# Patient Record
Sex: FEMALE | Race: WHITE | NOT HISPANIC OR LATINO | Employment: FULL TIME | URBAN - METROPOLITAN AREA
[De-identification: names, ages, dates, MRNs, and addresses within clinical notes are randomized per-mention and may not be internally consistent; named-entity substitution may affect disease eponyms.]

---

## 2014-12-08 LAB — CRC RECOMMENDATION EXT: NORMAL

## 2018-07-02 ENCOUNTER — HISTORICAL (OUTPATIENT)
Dept: ADMINISTRATIVE | Facility: HOSPITAL | Age: 58
End: 2018-07-02

## 2018-07-16 ENCOUNTER — HISTORICAL (OUTPATIENT)
Dept: ADMINISTRATIVE | Facility: HOSPITAL | Age: 58
End: 2018-07-16

## 2018-07-16 LAB
ABS NEUT (OLG): 6.94 X10(3)/MCL (ref 2.1–9.2)
ALBUMIN SERPL-MCNC: 4 GM/DL (ref 3.4–5)
ALBUMIN/GLOB SERPL: 1 RATIO (ref 1.1–2)
ALP SERPL-CCNC: 75 UNIT/L (ref 38–126)
ALT SERPL-CCNC: 25 UNIT/L (ref 12–78)
APPEARANCE, UA: CLEAR
APTT PPP: 24.8 SECOND(S) (ref 24.8–36.9)
AST SERPL-CCNC: 19 UNIT/L (ref 15–37)
BACTERIA SPEC CULT: NORMAL /HPF
BASOPHILS # BLD AUTO: 0 X10(3)/MCL (ref 0–0.2)
BASOPHILS NFR BLD AUTO: 0 %
BILIRUB SERPL-MCNC: 0.2 MG/DL (ref 0.2–1)
BILIRUB UR QL STRIP: NEGATIVE
BILIRUBIN DIRECT+TOT PNL SERPL-MCNC: 0.1 MG/DL (ref 0–0.5)
BILIRUBIN DIRECT+TOT PNL SERPL-MCNC: 0.1 MG/DL (ref 0–0.8)
BUN SERPL-MCNC: 15 MG/DL (ref 7–18)
CALCIUM SERPL-MCNC: 10.2 MG/DL (ref 8.5–10.1)
CHLORIDE SERPL-SCNC: 99 MMOL/L (ref 98–107)
CO2 SERPL-SCNC: 29 MMOL/L (ref 21–32)
COLOR UR: YELLOW
CREAT SERPL-MCNC: 0.75 MG/DL (ref 0.55–1.02)
EOSINOPHIL # BLD AUTO: 0.2 X10(3)/MCL (ref 0–0.9)
EOSINOPHIL NFR BLD AUTO: 2 %
ERYTHROCYTE [DISTWIDTH] IN BLOOD BY AUTOMATED COUNT: 12.1 % (ref 11.5–17)
GLOBULIN SER-MCNC: 3.9 GM/DL (ref 2.4–3.5)
GLUCOSE (UA): NEGATIVE
GLUCOSE SERPL-MCNC: 102 MG/DL (ref 74–106)
HCT VFR BLD AUTO: 41.6 % (ref 37–47)
HGB BLD-MCNC: 13.5 GM/DL (ref 12–16)
HGB UR QL STRIP: NEGATIVE
INR PPP: 0.9 (ref 0–1.27)
KETONES UR QL STRIP: NEGATIVE
LEUKOCYTE ESTERASE UR QL STRIP: NEGATIVE
LYMPHOCYTES # BLD AUTO: 1.8 X10(3)/MCL (ref 0.6–4.6)
LYMPHOCYTES NFR BLD AUTO: 19 %
MCH RBC QN AUTO: 30.5 PG (ref 27–31)
MCHC RBC AUTO-ENTMCNC: 32.5 GM/DL (ref 33–36)
MCV RBC AUTO: 94.1 FL (ref 80–94)
MONOCYTES # BLD AUTO: 0.7 X10(3)/MCL (ref 0.1–1.3)
MONOCYTES NFR BLD AUTO: 7 %
NEUTROPHILS # BLD AUTO: 6.94 X10(3)/MCL (ref 2.1–9.2)
NEUTROPHILS NFR BLD AUTO: 72 %
NITRITE UR QL STRIP: NEGATIVE
PH UR STRIP: 5.5 [PH] (ref 5–9)
PLATELET # BLD AUTO: 432 X10(3)/MCL (ref 130–400)
PMV BLD AUTO: 9.6 FL (ref 9.4–12.4)
POTASSIUM SERPL-SCNC: 5.4 MMOL/L (ref 3.5–5.1)
PROT SERPL-MCNC: 7.9 GM/DL (ref 6.4–8.2)
PROT UR QL STRIP: NEGATIVE
PROTHROMBIN TIME: 12.4 SECOND(S) (ref 12.2–14.7)
RBC # BLD AUTO: 4.42 X10(6)/MCL (ref 4.2–5.4)
RBC #/AREA URNS HPF: NORMAL /[HPF]
SODIUM SERPL-SCNC: 134 MMOL/L (ref 136–145)
SP GR UR STRIP: 1.01 (ref 1–1.03)
SQUAMOUS EPITHELIAL, UA: NORMAL
TRANSFERRIN SERPL-MCNC: 307 MG/DL (ref 200–360)
UROBILINOGEN UR STRIP-ACNC: 0.2
WBC # SPEC AUTO: 9.7 X10(3)/MCL (ref 4.5–11.5)
WBC #/AREA URNS HPF: NORMAL /[HPF]

## 2018-07-18 LAB — FINAL CULTURE: NORMAL

## 2018-08-29 ENCOUNTER — HISTORICAL (OUTPATIENT)
Dept: ADMINISTRATIVE | Facility: HOSPITAL | Age: 58
End: 2018-08-29

## 2018-09-10 ENCOUNTER — HISTORICAL (OUTPATIENT)
Dept: ADMINISTRATIVE | Facility: HOSPITAL | Age: 58
End: 2018-09-10

## 2018-09-10 LAB
ABS NEUT (OLG): 7.6
ALBUMIN SERPL-MCNC: 4.7 GM/DL (ref 3.4–5)
ALBUMIN/GLOB SERPL: 1.34 {RATIO} (ref 1.5–2.5)
ALP SERPL-CCNC: 60 UNIT/L (ref 38–126)
ALT SERPL-CCNC: 15 UNIT/L (ref 7–52)
AST SERPL-CCNC: 18 UNIT/L (ref 15–37)
BILIRUB SERPL-MCNC: 0.5 MG/DL (ref 0.2–1)
BILIRUBIN DIRECT+TOT PNL SERPL-MCNC: 0.1 MG/DL (ref 0–0.5)
BILIRUBIN DIRECT+TOT PNL SERPL-MCNC: 0.4 MG/DL
BUN SERPL-MCNC: 13 MG/DL (ref 7–18)
CALCIUM SERPL-MCNC: 9.6 MG/DL (ref 8.5–10)
CHLORIDE SERPL-SCNC: 99 MMOL/L (ref 98–107)
CHOLEST SERPL-MCNC: 171 MG/DL (ref 0–200)
CHOLEST/HDLC SERPL: 2.7 {RATIO}
CO2 SERPL-SCNC: 29 MMOL/L (ref 21–32)
CREAT SERPL-MCNC: 0.73 MG/DL (ref 0.6–1.3)
ERYTHROCYTE [DISTWIDTH] IN BLOOD BY AUTOMATED COUNT: 13.4 % (ref 11.5–17)
GLOBULIN SER-MCNC: 3.5 GM/DL (ref 1.2–3)
GLUCOSE SERPL-MCNC: 108 MG/DL (ref 74–106)
HCT VFR BLD AUTO: 40.2 % (ref 37–47)
HDLC SERPL-MCNC: 64 MG/DL (ref 35–60)
HGB BLD-MCNC: 13.2 GM/DL (ref 12–16)
LDLC SERPL CALC-MCNC: 103 MG/DL (ref 0–129)
LYMPHOCYTES # BLD AUTO: 1.5 X10(3)/MCL (ref 0.6–3.4)
LYMPHOCYTES NFR BLD AUTO: 15.5 % (ref 13–40)
MCH RBC QN AUTO: 32 PG (ref 27–31.2)
MCHC RBC AUTO-ENTMCNC: 33 GM/DL (ref 32–36)
MCV RBC AUTO: 97 FL (ref 80–94)
MONOCYTES # BLD AUTO: 0.5 X10(3)/MCL (ref 0–1.8)
MONOCYTES NFR BLD AUTO: 5.7 % (ref 0.1–24)
NEUTROPHILS NFR BLD AUTO: 78.8 % (ref 47–80)
PLATELET # BLD AUTO: 395 X10(3)/MCL (ref 130–400)
PMV BLD AUTO: 9 FL
POTASSIUM SERPL-SCNC: 4.6 MMOL/L (ref 3.5–5.1)
PROT SERPL-MCNC: 8.2 GM/DL (ref 6.4–8.2)
RBC # BLD AUTO: 4.13 X10(6)/MCL (ref 4.2–5.4)
SODIUM SERPL-SCNC: 135 MMOL/L (ref 136–145)
TRIGL SERPL-MCNC: 141 MG/DL (ref 30–150)
VLDLC SERPL CALC-MCNC: 28.2 MG/DL
WBC # SPEC AUTO: 9.6 X10(3)/MCL (ref 4.5–11.5)

## 2019-06-10 ENCOUNTER — HISTORICAL (OUTPATIENT)
Dept: LAB | Facility: HOSPITAL | Age: 59
End: 2019-06-10

## 2019-06-10 ENCOUNTER — HISTORICAL (OUTPATIENT)
Dept: ADMINISTRATIVE | Facility: HOSPITAL | Age: 59
End: 2019-06-10

## 2019-06-10 LAB
ABS NEUT (OLG): 6.9 X10(3)/MCL (ref 2.1–9.2)
ALBUMIN SERPL-MCNC: 4.6 GM/DL (ref 3.4–5)
ALBUMIN/GLOB SERPL: 1.59 {RATIO} (ref 1.5–2.5)
ALP SERPL-CCNC: 52 UNIT/L (ref 38–126)
ALT SERPL-CCNC: 23 UNIT/L (ref 7–52)
APPEARANCE, UA: CLEAR
AST SERPL-CCNC: 25 UNIT/L (ref 15–37)
BACTERIA #/AREA URNS AUTO: NORMAL /HPF
BILIRUB SERPL-MCNC: 0.4 MG/DL (ref 0.2–1)
BILIRUB UR QL STRIP: NEGATIVE MG/DL
BILIRUBIN DIRECT+TOT PNL SERPL-MCNC: 0.1 MG/DL (ref 0–0.5)
BILIRUBIN DIRECT+TOT PNL SERPL-MCNC: 0.3 MG/DL
BUN SERPL-MCNC: 11 MG/DL (ref 7–18)
CALCIUM SERPL-MCNC: 9.4 MG/DL (ref 8.5–10)
CHLORIDE SERPL-SCNC: 97 MMOL/L (ref 98–107)
CHOLEST SERPL-MCNC: 189 MG/DL (ref 0–200)
CHOLEST/HDLC SERPL: 2.9 {RATIO}
CO2 SERPL-SCNC: 25 MMOL/L (ref 21–32)
COLOR UR: NORMAL
CREAT SERPL-MCNC: 0.79 MG/DL (ref 0.6–1.3)
ERYTHROCYTE [DISTWIDTH] IN BLOOD BY AUTOMATED COUNT: 13 % (ref 11.5–17)
GLOBULIN SER-MCNC: 2.9 GM/DL (ref 1.2–3)
GLUCOSE (UA): NEGATIVE MG/DL
GLUCOSE SERPL-MCNC: 110 MG/DL (ref 74–106)
HCT VFR BLD AUTO: 40.9 % (ref 37–47)
HDLC SERPL-MCNC: 66 MG/DL (ref 35–60)
HGB BLD-MCNC: 13.7 GM/DL (ref 12–16)
HGB UR QL STRIP: NEGATIVE UNIT/L
KETONES UR QL STRIP: NEGATIVE MG/DL
LDLC SERPL CALC-MCNC: 102 MG/DL (ref 0–129)
LEUKOCYTE ESTERASE UR QL STRIP: NEGATIVE UNIT/L
LYMPHOCYTES # BLD AUTO: 1.1 X10(3)/MCL (ref 0.6–3.4)
LYMPHOCYTES NFR BLD AUTO: 12.4 % (ref 13–40)
MCH RBC QN AUTO: 31.1 PG (ref 27–31.2)
MCHC RBC AUTO-ENTMCNC: 34 GM/DL (ref 32–36)
MCV RBC AUTO: 93 FL (ref 80–94)
MONOCYTES # BLD AUTO: 0.6 X10(3)/MCL (ref 0.1–1.3)
MONOCYTES NFR BLD AUTO: 6.9 % (ref 0.1–24)
NEUTROPHILS NFR BLD AUTO: 80.7 % (ref 47–80)
NITRITE UR QL STRIP.AUTO: NEGATIVE
PH UR STRIP: 5.5 [PH]
PLATELET # BLD AUTO: 373 X10(3)/MCL (ref 130–400)
PMV BLD AUTO: 9.7 FL (ref 9.4–12.4)
POTASSIUM SERPL-SCNC: 4.1 MMOL/L (ref 3.5–5.1)
PROT SERPL-MCNC: 7.5 GM/DL (ref 6.4–8.2)
PROT UR QL STRIP: NEGATIVE MG/DL
RBC # BLD AUTO: 4.4 X10(6)/MCL (ref 4.2–5.4)
RBC #/AREA URNS HPF: NORMAL /HPF
SODIUM SERPL-SCNC: 135 MMOL/L (ref 136–145)
SP GR UR STRIP: <1.005
SQUAMOUS EPITHELIAL, UA: NORMAL /LPF
TRIGL SERPL-MCNC: 127 MG/DL (ref 30–150)
TSH SERPL-ACNC: 5.83 MIU/ML (ref 0.35–4.94)
UROBILINOGEN UR STRIP-ACNC: 0.2 MG/DL
VLDLC SERPL CALC-MCNC: 25.4 MG/DL
WBC # SPEC AUTO: 8.6 X10(3)/MCL (ref 4.5–11.5)
WBC #/AREA URNS AUTO: NORMAL /[HPF]

## 2019-06-11 ENCOUNTER — HISTORICAL (OUTPATIENT)
Dept: LAB | Facility: HOSPITAL | Age: 59
End: 2019-06-11

## 2019-06-11 LAB — C DIFF INTERP: NEGATIVE

## 2019-06-14 LAB — FINAL CULTURE: NORMAL

## 2019-10-08 ENCOUNTER — HISTORICAL (OUTPATIENT)
Dept: ADMINISTRATIVE | Facility: HOSPITAL | Age: 59
End: 2019-10-08

## 2019-10-08 LAB
ABS NEUT (OLG): 5.3 X10(3)/MCL (ref 2.1–9.2)
ALBUMIN SERPL-MCNC: 4.7 GM/DL (ref 3.4–5)
ALBUMIN/GLOB SERPL: 1.47 {RATIO} (ref 1.5–2.5)
ALP SERPL-CCNC: 78 UNIT/L (ref 38–126)
ALT SERPL-CCNC: 42 UNIT/L (ref 7–52)
APPEARANCE, UA: CLEAR
AST SERPL-CCNC: 34 UNIT/L (ref 15–37)
BACTERIA #/AREA URNS AUTO: NORMAL /HPF
BILIRUB SERPL-MCNC: 0.4 MG/DL (ref 0.2–1)
BILIRUB UR QL STRIP: NEGATIVE MG/DL
BILIRUBIN DIRECT+TOT PNL SERPL-MCNC: 0.1 MG/DL (ref 0–0.5)
BILIRUBIN DIRECT+TOT PNL SERPL-MCNC: 0.3 MG/DL
BUN SERPL-MCNC: 13 MG/DL (ref 7–18)
CALCIUM SERPL-MCNC: 10 MG/DL (ref 8.5–10)
CHLORIDE SERPL-SCNC: 100 MMOL/L (ref 98–107)
CHOLEST SERPL-MCNC: 208 MG/DL (ref 0–200)
CHOLEST/HDLC SERPL: 3.4 {RATIO}
CO2 SERPL-SCNC: 29 MMOL/L (ref 21–32)
COLOR UR: NORMAL
CREAT SERPL-MCNC: 0.7 MG/DL (ref 0.6–1.3)
ERYTHROCYTE [DISTWIDTH] IN BLOOD BY AUTOMATED COUNT: 11.9 % (ref 11.5–17)
GLOBULIN SER-MCNC: 3.2 GM/DL (ref 1.2–3)
GLUCOSE (UA): NEGATIVE MG/DL
GLUCOSE SERPL-MCNC: 118 MG/DL (ref 74–106)
H PYLORI AB SER IA-ACNC: POSITIVE
HCT VFR BLD AUTO: 41.9 % (ref 37–47)
HDLC SERPL-MCNC: 62 MG/DL (ref 35–60)
HGB BLD-MCNC: 14.1 GM/DL (ref 12–16)
HGB UR QL STRIP: NEGATIVE UNIT/L
KETONES UR QL STRIP: NEGATIVE MG/DL
LDLC SERPL CALC-MCNC: 115 MG/DL (ref 0–129)
LEUKOCYTE ESTERASE UR QL STRIP: NEGATIVE UNIT/L
LYMPHOCYTES # BLD AUTO: 1.6 X10(3)/MCL (ref 0.6–3.4)
LYMPHOCYTES NFR BLD AUTO: 21.6 % (ref 13–40)
MCH RBC QN AUTO: 30.9 PG (ref 27–31.2)
MCHC RBC AUTO-ENTMCNC: 34 GM/DL (ref 32–36)
MCV RBC AUTO: 92 FL (ref 80–94)
MONOCYTES # BLD AUTO: 0.6 X10(3)/MCL (ref 0.1–1.3)
MONOCYTES NFR BLD AUTO: 7.7 % (ref 0.1–24)
NEUTROPHILS NFR BLD AUTO: 70.7 % (ref 47–80)
NITRITE UR QL STRIP.AUTO: NEGATIVE
PH UR STRIP: 6 [PH]
PLATELET # BLD AUTO: 370 X10(3)/MCL (ref 130–400)
PMV BLD AUTO: 9.4 FL (ref 9.4–12.4)
POTASSIUM SERPL-SCNC: 5.2 MMOL/L (ref 3.5–5.1)
PROT SERPL-MCNC: 7.9 GM/DL (ref 6.4–8.2)
PROT UR QL STRIP: NEGATIVE MG/DL
RBC # BLD AUTO: 4.56 X10(6)/MCL (ref 4.2–5.4)
RBC #/AREA URNS HPF: NORMAL /HPF
SODIUM SERPL-SCNC: 137 MMOL/L (ref 136–145)
SP GR UR STRIP: <1.005
SQUAMOUS EPITHELIAL, UA: NORMAL /LPF
TRIGL SERPL-MCNC: 152 MG/DL (ref 30–150)
TSH SERPL-ACNC: 0.06 MIU/ML (ref 0.35–4.94)
UROBILINOGEN UR STRIP-ACNC: 0.2 MG/DL
VLDLC SERPL CALC-MCNC: 30.4 MG/DL
WBC # SPEC AUTO: 7.5 X10(3)/MCL (ref 4.5–11.5)
WBC #/AREA URNS AUTO: NORMAL /[HPF]

## 2019-10-09 LAB
EST. AVERAGE GLUCOSE BLD GHB EST-MCNC: 97 MG/DL
HBA1C MFR BLD: 5 % (ref 4.4–6.4)

## 2021-12-30 LAB — BCS RECOMMENDATION EXT: NORMAL

## 2022-01-20 ENCOUNTER — HISTORICAL (OUTPATIENT)
Dept: ADMINISTRATIVE | Facility: HOSPITAL | Age: 62
End: 2022-01-20

## 2022-04-10 ENCOUNTER — HISTORICAL (OUTPATIENT)
Dept: ADMINISTRATIVE | Facility: HOSPITAL | Age: 62
End: 2022-04-10

## 2022-04-28 VITALS
WEIGHT: 211 LBS | OXYGEN SATURATION: 95 % | SYSTOLIC BLOOD PRESSURE: 134 MMHG | BODY MASS INDEX: 37.39 KG/M2 | HEIGHT: 63 IN | DIASTOLIC BLOOD PRESSURE: 88 MMHG

## 2022-05-04 NOTE — HISTORICAL [facility] CERNER
This is a historical note converted from [employer]. Formatting and pictures may have been removed.  Please reference [facility] for original formatting and attached multimedia. Chief Complaint  PT IS HERE FOR POST OP LEFT TOTAL HIP.  History of Present Illness  one month status post left total hip arthroplasty  She is doing great, no complete the pain right now  She is currently ambulating with a walker and attending physical therapy  Review of Systems  Systemic: No fever, no chills, and no recent weight change.  Head: No headache - frequent.  Eyes: No vision problems.  Otolarnygeal: No hearing loss, no earache, no epistaxis, no hoarseness, and no tooth pain. Gums normal.  Cardiovascular: No chest pain or discomfort and no palpitations.  Pulmonary: No pulmonary symptoms - no dyspnea, no shortness of breath  Gastrointestinal: Appetite not decreased. No dysphagia and no constant eructation. No nausea, no vomiting, no abdominal pain, no hematochezia.  Genitourinary: No genitourinary symptoms - No urinary hesitancy. No urinary loss of control - no burning sensation during urination.  Musculoskeletal: No calf muscle cramps and no localized soft tissue swelling  Neurological: No fainting and no convulsions.  Psychological: no depression.  Skin: No rash.  Physical Exam  Vitals & Measurements  HT:?163?cm? HT:?163?cm? WT:?75.7?kg? WT:?75.7?kg? BMI:?28.49?  Musculoskeletal System:  left Hips:  General/bilateral: ? No swelling of the hips. ? No induration of the hips. ? No tenderness on palpation of the hips. ? No instability of the hips was noted.  left ?Hip: ? Motion was normal.  Neurological:  Motor (Strength): ? Weakness of the?left hip was observed.  Skin:  ? No cellulitis.  Wound healing normal. Surgical incision C/D/I  Tests  Imaging:  X-Ray Of The Pelvis:  Pelvic x-ray was performed.  X-Ray Hip:  Complete?left hip x-ray with two or more views of the AP and lateral aspects were performed.  Impressions Radiology Test  X-ray of  hip was performed showed intact?left hip prosthesis.  Assessment/Plan  Status post total hip replacement, left  ? transition to full weightbearing with assistance of a cane and continue physical therapy. Shell follow up in 3 months for repeat evaluation.?Dr. Red has examined patient and agrees with plan  Ordered:  Clinic Follow up, *Est. 11/29/18 3:00:00 CST, Order for future visit, [facility]  External Referral, DME, four prong cane, 08/29/18 14:26:00 CDT, Status post total hip replacement, left  Post-Op follow-up visit [id number] PC, Status post total hip replacement, left, [facility], 08/29/18 14:26:00 CDT  PT/OT External Referral, 08/29/18 14:26:00 CDT, Status post total hip replacement, left, Anit Grav Hip Abductor & Extensor Exc.  Aquatics for ROM & Strength  Isotonic hamstring & Closed Chain Quad  No Dry Needling  Therapeutic Excercise  Stretch and Strengthen, 3 X Week,...  ?   Problem List/Past Medical History  Ongoing  Anxiety  Avascular necrosis of femur head, left  Carotid artery disease  Cyst of kidney  Diverticulitis  Graves disease  Hiatal hernia  High cholesterol  Osteoarthritis of left hip  Sleep apnea  Thyroid disease  Umbilical hernia  Historical  HTN (hypertension)  Palpitations  Procedure/Surgical History  [facility] Total Hip Arthroplasty (Left) (08/02/2018)  Replacement of Left Hip Joint with Synthetic Substitute, Uncemented, Open Approach (08/02/2018)  lumbar surgery (11/13/2015)  Right hand finger cyst (09/06/2015)  left knee scope (2009)  breast reduction (2004)  Achilles tendon repair (07/1996)  colonoscopy  tonsillectomy  upper GI   Medications  Ambien 10 mg oral tablet, 10 mg= 1 tab(s), Oral, Once a day (at bedtime), PRN, 5 refills  aspirin 81 mg oral Delayed Release (EC) tablet, 81 mg= 1 tab(s), Oral, BID  aspirin 81 mg oral tablet, 81 mg= 1 tab(s), Oral, Daily  ATORVASTATIN 40MG TABLETS, 40 mg= 1 tab(s), Oral,  Daily  CeleBREX 200 mg oral capsule, 200 mg= 1 cap(s), Oral, Daily  Colace 100 mg oral capsule, 200 mg= 2 cap(s), Oral, Daily  Dilaudid, 1 mg= 0.5 mL, IM, Once  fenofibrate 54 mg oral tablet, 54 mg, Oral, Daily  LEVOTHYROXINE SODIUM 125 MCG TABS, Oral, Daily,? ?Still taking, not as prescribed: takes 1 tab(s) 6 days a week and on Sat takes .5 tab(s)  METOPROLOL ER SUCCINATE 100MG TABS, 150 mg= 1.5 tab(s), Oral, Daily  Multi-Day Plus Minerals oral tablet, 1 tab(s), Oral, Daily  Percocet 5/325 oral tablet, 1 tab(s), Oral, q4hr, PRN  VALSARTAN 160 MG TABS, 160 mg= 1 tab(s), Oral, Daily  Allergies  penicillins  Social History  Alcohol - Medium Risk, 07/22/2012  Current, Beer, Wine, Daily, 07/02/2018  Employment/School  Retired, disabled, 07/02/2018  Tobacco - Denies Tobacco Use, 07/22/2012  Never smoker Use:., 07/02/2018  Health Maintenance  Health Maintenance  ???Pending?(in the next year)  ??? ??OverDue  ??? ? ? ?Colorectal Screening due??11/11/15??and every 1??year(s)  ??? ??Due?  ??? ? ? ?Alcohol Misuse Screening due??08/29/18??and every 1??year(s)  ??? ? ? ?Breast Cancer Screening due??08/29/18??and every?  ??? ? ? ?Cervical Cancer Screening due??08/29/18??and every?  ??? ? ? ?Diabetes Screening due??08/29/18??and every?  ??? ? ? ?Influenza Vaccine due??08/29/18??and every?  ??? ? ? ?Tetanus Vaccine due??08/29/18??and every 10??year(s)  ??? ??Due In Future?  ??? ? ? ?Blood Pressure Screening not due until??07/16/19??and every 1??year(s)  ??? ? ? ?Aspirin Therapy for CVD Prevention not due until??08/03/19??and every 1??year(s)  ???Satisfied?(in the past 1 year)  ??? ??Satisfied?  ??? ? ? ?Aspirin Therapy for CVD Prevention on??08/03/18.??Satisfied by Carola Stevens NP  ??? ? ? ?Blood Pressure Screening on??08/03/18.??Satisfied by Karlene Tavarez CNA  ??? ? ? ?Body Mass Index Check on??08/29/18.??Satisfied by Leidy Winkler LPN  ??? ? ? ?Depression Screening on??08/29/18.??Satisfied by Leidy Winkler LPN  ??? ? ?  ?Diabetes Screening on??08/03/18.??Satisfied by Nita Darden  ??? ? ? ?Obesity Screening on??08/29/18.??Satisfied by Leidy Winkler LPN  ?  ?

## 2022-05-04 NOTE — HISTORICAL [facility] CERNER
This is a historical note converted from [employer]aly. Formatting and pictures may have been removed.  Please reference Daly for original formatting and attached multimedia. Chief Complaint  Cough -productive (white drainage), sneezing, fatigue, diarrhea, tenderness left rib cage area. x 1 month  History of Present Illness  1 month history of cough with production of white mucous.? Associated with sneezing and fatigue.?  Also reports intermittent diarrhea.? Mucous in stool at times.?  Left upper quadrant abdominal pain and left lower quadrant pain.?  2-3 weeks of abdominal bloating and decreased appetite.  Recently started on fenofibrate for hypertriglyceridemia.? Patient reports not tolerating well?due to overall?malaise associated.? Compliant with atorvastatin 40 mg daily.  Review of Systems  Constitutional:?No weight loss, no fever, fatigue, no chills, no night sweats,?no weakness  Eyes:?No blurred vision,?no redness,?no drainage,?no ocular?pain  HEENT:?No sore throat,?no ear pain, no sinus pressure, no nasal congestion, no rhinorrhea, no postnasal drip  Respiratory:?cough, no wheezing, sputum production, no shortness of breath  Cardiovascular:?No chest pain, no palpitations, no dyspnea on exertion,?no orthopnea  Gastrointestinal:?No nausea, no vomiting, abdominal pain, diarrhea,?no constipation, no melena,?no hematochezia  Genitourinary:?No dysuria, no hematuria, no frequency, no urgency, no incontinence, no discharge  Musculoskeletal:?No myalgias, no arthralgias, no weakness, no joint effusion, no edema  Integumentary:?No rashes, no hives, no itching, no lesions, no jaundice  Neurologic:?No headaches, no numbness, no tingling, no weakness, no dizziness  ?  Physical Exam  Vitals & Measurements  T:?36.8? ?C (Oral)? HR:?73(Peripheral)? BP:?126/82?  HT:?160?cm? WT:?81.7?kg? BMI:?31.91?  General:?Well developed, Well-nourished, in No acute distress, A&O x 4  Eye:?PERRLA, EOMI, Clear conjunctiva, Eyelids  unremarkable  Ears:?Bilateral EAC clear?and?Bilateral TM clear  Nose:? Mucosa normal, No rhinorrhea, No lesions  O/P:??No?erythema,?No tonsillar hypertrophy,?No tonsillar exudates,?No cobblestoning  Neck:?Soft, Nontender, No thyromegaly, Full range of motion, No cervical lymphadenopathy, No JVD  Cardiovascular:?Regular rate and rhythm, No murmurs, No gallops, No rubs  Respiratory:?Clear to auscultation bilaterally, No wheezes, No rhonchi, No?crackles  Abdomen:? Soft, Nontender, No hepatosplenomegaly, Normal and equal bowel sounds, No masses, No rebound, No guarding  Musculoskeletal:? No tenderness, FROM, No joint abnormality, No clubbing, No cyanosis,No?edema  Neurologic:? No motor or sensory deficits, Reflexes 2+ throughout, CN II-XII grossly intact  Integumentary:?No rashes or skin lesions  ?  Assessment/Plan  1.?Cough?R05  ?Chest x-ray-no consolidation or effusion noted  Ordered:  Mycoplasma by PCR, Routine collect, Throat, Collected, 06/10/19 8:51:00 CDT by CLIENT, Stop date 06/10/19 8:51:00 CDT, Lab Collect, Micro Swab, Cough, ~INHERIT, Print Label By Order Location, 06/10/19 8:51:00 CDT  Office/Outpatient Visit Level 4 Established 12040 PC, Cough  Diarrhea  Left-sided chest wall pain  Left sided abdominal pain  Hypercholesterolemia, [facility] - AFP, 06/10/19 8:50:00 CDT  ?  2.?Diarrhea?R19.7  Ordered:  Clostridium Difficile by PCR, Routine collect, 06/10/19 8:50:00 CDT, Stool Clostrium difficile, Order for future visit, Stop date 06/10/19 8:50:00 CDT, Nurse collect, Diarrhea, 06/10/19 8:50:00 CDT  Fecal Leukocytes - Lactoferrin on stool, Routine collect, 06/10/19 8:50:00 CDT, Stool, Order for future visit, Stop date 06/10/19 8:50:00 CDT, Nurse collect, Diarrhea, 06/10/19 8:50:00 CDT  Office/Outpatient Visit Level 4 Established 66033 PC, Cough  Diarrhea  Left-sided chest wall pain  Left sided abdominal pain  Hypercholesterolemia, [facility] AMB - AFP, 06/10/19 8:50:00 CDT  Stool Culture, Routine collect,  06/10/19 8:50:00 CDT, Order for future visit, Stool, Stop date 06/10/19 8:50:00 CDT, Diarrhea  ?  3.?Left-sided chest wall pain?R07.89  ?Chest x-ray-no consolidation or effusion noted  Ordered:  Office/Outpatient Visit Level 4 Established 60078 PC, Cough  Diarrhea  Left-sided chest wall pain  Left sided abdominal pain  Hypercholesterolemia, [facility] AMB - AFP, 06/10/19 8:50:00 CDT  ?  4.?Left sided abdominal pain?R10.9  ?CBC, CMP, stool studies  Ordered:  Office/Outpatient Visit Level 4 Established 33342 PC, Cough  Diarrhea  Left-sided chest wall pain  Left sided abdominal pain  Hypercholesterolemia, [facility] AMB - AFP, 06/10/19 8:50:00 CDT  Urinalysis Complete no reflex, Routine collect, Urine, 06/10/19 9:05:00 CDT, Stop date 06/10/19 9:05:00 CDT, Nurse collect, Left sided abdominal pain  ?  5.?Hypercholesterolemia?E78.00  ?CMP and FLP today  Ordered:  Comprehensive Metabolic Panel, Routine collect, 06/10/19 9:05:00 CDT, Blood, Stop date 06/10/19 9:05:00 CDT, Lab Collect, Hypercholesterolemia, 06/10/19 9:05:00 CDT  Office/Outpatient Visit Level 4 Established 86584 PC, Cough  Diarrhea  Left-sided chest wall pain  Left sided abdominal pain  Hypercholesterolemia, [facility] AMB - AFP, 06/10/19 8:50:00 CDT  ?   Problem List/Past Medical History  Ongoing  Anxiety  Avascular necrosis of femur head, left  Carotid artery disease  Cyst of kidney  Diverticulitis  Hiatal hernia  HTN (hypertension)  Hypercholesterolemia  Hypothyroidism  Insomnia  Osteoarthritis of left hip  Sleep apnea  Umbilical hernia  Historical  Graves disease  Palpitations  Procedure/Surgical History  [facility] Total Hip Arthroplasty (Left) (08/02/2018)  Replacement of Left Hip Joint with Synthetic Substitute, Uncemented, Open Approach (08/02/2018)  lumbar surgery (11/13/2015)  Right hand finger cyst (09/06/2015)  left knee scope (2009)  breast reduction (2004)  Achilles tendon repair (07.1996)  colonoscopy  tonsillectomy  upper GI   Medications  Ambien  10 mg oral tablet, 10 mg= 1 tab(s), Oral, Once a day (at bedtime), PRN, 1 refills  aspirin 81 mg oral tablet, 81 mg= 1 tab(s), Oral, Daily  ATORVASTATIN 40MG TABLETS, 40 mg= 1 tab(s), Oral, Daily  fenofibrate 54 mg oral tablet, 54 mg, Oral, Daily  LEVOTHYROXINE SODIUM 125 MCG TABS, Oral, Daily  losartan 25 mg oral tablet, 25 mg= 1 tab(s), Oral, BID  METOPROLOL ER SUCCINATE 100MG TABS, 150 mg= 1.5 tab(s), Oral, Daily  Multi-Day Plus Minerals oral tablet, 1 tab(s), Oral, Daily  ranitidine 150 mg oral tablet, 150 mg= 1 tab(s), Oral, Daily  Allergies  penicillins  Social History  Abuse/Neglect  No, No, 06/10/2019  Alcohol - Medium Risk, 07/22/2012  Current, Beer, Wine, Daily, 07/02/2018  Employment/School  Retired, disabled, 07/02/2018  Tobacco - Denies Tobacco Use, 07/22/2012  Never (less than 100 in lifetime), No, 06/10/2019  Never smoker Use:., 07/02/2018  Family History  CAD - Coronary artery disease: Brother and Brother.  Hypercholesterolemia: Brother, Brother, Brother, Brother and Brother.  Hypertension.: Mother, Brother, Brother, Brother, Brother and Brother.  Parkinsons disease: Father.  Renal cancer: Mother.  Immunizations  Vaccine Date Status   influenza virus vaccine, inactivated 09/10/2018 Given   tetanus/diphtheria/pertussis, acel(Tdap) 04/09/2015 Recorded   zoster vaccine live 11/11/2014 Recorded   Health Maintenance  Health Maintenance  ???Pending?(in the next year)  ??? ??OverDue  ??? ? ? ?Diabetes Screening due??and every?  ??? ? ? ?Colorectal Screening due??11/11/15??and every 1??year(s)  ??? ? ? ?Alcohol Misuse Screening due??01/01/19??and every 1??year(s)  ??? ??Due?  ??? ? ? ?ADL Screening due??06/10/19??and every 1??year(s)  ??? ? ? ?Breast Cancer Screening due??06/10/19??and every?  ??? ? ? ?Cervical Cancer Screening due??06/10/19??and every?  ??? ??Due In Future?  ??? ? ? ?Aspirin Therapy for CVD Prevention not due until??08/03/19??and every 1??year(s)  ??? ? ? ?Depression Screening not due  until??08/29/19??and every 1??year(s)  ??? ? ? ?Obesity Screening not due until??01/01/20??and every 1??year(s)  ??? ? ? ?Blood Pressure Screening not due until??06/09/20??and every 1??year(s)  ??? ? ? ?Body Mass Index Check not due until??06/09/20??and every 1??year(s)  ??? ? ? ?Hypertension Management-Blood Pressure not due until??06/09/20??and every 1??year(s)  ??? ? ? ?Hypertension Management-BMP not due until??06/09/20??and every 1??year(s)  ???Satisfied?(in the past 1 year)  ??? ??Satisfied?  ??? ? ? ?Aspirin Therapy for CVD Prevention on??08/03/18.??Satisfied by Ana Sauceda RN  ??? ? ? ?Blood Pressure Screening on??06/10/19.??Satisfied by Sheri Suarez CMA.  ??? ? ? ?Body Mass Index Check on??06/10/19.??Satisfied by Sheri Suarez CMA.  ??? ? ? ?Depression Screening on??08/29/18.??Satisfied by Leidy Winkler LPN  ??? ? ? ?Diabetes Screening on??06/10/19.??Satisfied by Marilou Fuchs  ??? ? ? ?Hypertension Management-BMP on??06/10/19.??Satisfied by Marilou Fuchs  ??? ? ? ?Influenza Vaccine on??09/10/18.??Satisfied by Judy Carney LPN  ??? ? ? ?Lipid Screening on??06/10/19.??Satisfied by Marilou Fuchs  ??? ? ? ?Obesity Screening on??06/10/19.??Satisfied by Sheri Suarez CMA M.  ?  ?  Diagnostic Results  (06/10/2019 09:22 CDT XR Chest 2 Views)  Radiology Report  EPA AND LATERAL CHEST:  ?  Indications: Cough  ?  No abnormalities of the lung fields, no infiltrates, effusions or failure.  No abnormalities of the heart and mediastinal structures.  No abnormal mass lesions or lymphadenopathy.  No abnormalities of the apices or CP angles.  No abnormalities of the bony thorax.  ?  No evidence of active tuberculosis.  ?  IMPRESSION: ? ?NORMAL ?STUDY.  ?  ?  ?  Signature Line  Electronically Signed By: Carlitos Doan MD  Date/Time Signed: 06/10/2019 11:11  ? [1]     [1]?XR Chest 2 Views; Carlitos Doan MD 06/10/2019 09:22 CDT

## 2022-05-04 NOTE — HISTORICAL [facility]ER
This is a historical note converted from [employer]. Formatting and pictures may have been removed.  Please reference [employer] for original formatting and attached multimedia. Chief Complaint  PT is here for left hip pain. PT stated it started a few months ago. She c/o clicking and popping in her hip.  History of Present Illness  Patient is here for evaluation of her left hip pain. ?Pain is been present and worsening for over a year. ?She hears a constant clicking and popping and locking of her left?hip.  She has occasional pain in the right hip.  Left_ HIP  ?  Hip joint pain  groin pain, worse with weightbearing  pain worsens with extended activity  pain increased with hip motion  joint stiffness  difficulty putting on socks and tying shoes  difficulty getting out of chair  ?  Review of Systems  Systemic: No fever, no chills, and no recent weight change.  Head: No headache - frequent.  Eyes: No vision problems.  Otolarnygeal: No hearing loss, no earache, no epistaxis, no hoarseness, and no tooth pain. Gums normal.  Cardiovascular: No chest pain or discomfort and no palpitations.  Pulmonary: No pulmonary symptoms - difficulty sleeping, no dyspnea, and cough not worse in the morning.  Gastrointestinal: Appetite not decreased. No dysphagia and no constant eructation. No nausea, no vomiting, no abdominal pain, no hematochezia, and no loose/mushy stools - frequent. No constipation - frequent.  Genitourinary: No genitourinary symptoms - Getting up every night to urinate and no increase in urinary frequency. No urinary hesitancy. No urinary loss of control - difficulty stopping urination and no burning sensation during urination.  Musculoskeletal: No calf muscle cramps and no localized soft tissue swelling of the ankle.  Neurological: No fainting and no convulsions.  Psychological: Not feeling nervous tension, not feeling nervous from exhaustion, and no depression.  Skin: No rash. Previous history of no ulcers.  Physical  Exam  Vitals & Measurements  HT:?163?cm? HT:?163?cm? WT:?76.200?kg? WT:?76.200?kg? BMI:?28.68?  ?  General Appearance:  Well developed. In no acute distress. Awake, alert, and oriented to person, place, time, and situation. Antalgic gait.  ?  Pulses:  Arterial Pulses: Posterior tibialis pulses were normal. Dorsalis pedis pulses were normal.  ?  Musculoskeletal System:  Left Hip:  General: No erythema of the hip. No swelling, no increased heat. Skin with no wounds or lesions. Mild lateral tenderness over trochanteric bursa. Groin tenderness with internal and external rotation as well as weight-bearing.  ?  Left Hip:  Hip Motion: Value  Passive internal rotation 0_ degrees  Passive external rotation _0 degrees  Passive flexion _80 degrees  Passive abduction _10 degrees  Passive adduction 0_ degrees  Flexion contracture 10_ degrees  ? Pain was elicited by active internal rotation with the hip extended. ? Pain was elicited by active internal rotation with the hip flexed. ? No swelling. ? No induration. ? Greater trochanter was tender on palpation. ? Hip was tender on palpation. pain was elicited by active external rotation with the hip extended and flexed. ? Hip was not dislocated. ? Hip was not subluxed. ? Hip did not show laxity. ? No instability was noted. The hip was tender with ambulation.  Left Lower Leg:  Mild quadriceps atrophy.  ?  Neurological:  Sensation: intact distally in foot  Motor (Strength): Mild weakness of the right lower extremity was observed.  Gait And Stance: ? A Left sided antalgic gait was observed.  ?  Skin:  ? Normal. ? No ulcer was seen on the feet.  ?  Left Hip xrays: _Collapsed left femoral head?with periacetabular and femoral head osteophytes.? Patient is completely bone-on-bone  ?  Pelvis xray: _Moderate osteoarthritis right hip?with?periacetabular and femoral head osteophytes.? Narrowed cartilage space.? Collapsed left femoral head with periacetabular and femoral head osteophytes.  ?Patient is completely bone-on-bone in the left hip.  ?  ?  ?   General Appearance:  Well developed. In no acute distress. Awake, alert, and oriented to person, place, time, and situation. Antalgic gait.  ?   Pulses:  Arterial Pulses: Posterior tibialis pulses were normal. Dorsalis pedis pulses were normal.  ?   Musculoskeletal System:  Right Hip:  General: No erythema of the hip. No swelling, no increased heat. Skin with no wounds or lesions. Mild lateral tenderness over trochanteric bursa. Groin tenderness with internal and external rotation as well as weight-bearing.  ?   Right Hip:  Hip Motion: Value  Passive internal rotation _20 degrees  Passive external rotation _60 degrees  Passive flexion 120_ degrees  Passive abduction 30_ degrees  Passive adduction _10 degrees  Flexion contracture _0 degrees  ? Pain was elicited by active internal rotation with the hip extended. ? Pain was elicited by active internal rotation with the hip flexed. ? No swelling. ? No induration. ? Greater trochanter was tender on palpation. ? Hip was tender on palpation. pain was elicited by active external rotation with the hip extended and flexed. ? Hip was not dislocated. ? Hip was not subluxed. ? Hip did not show laxity. ? No instability was noted. The hip was tender with ambulation.  Right Lower Leg:  Mild quadriceps atrophy.  ?   Neurological:  Sensation: intact distally in foot  Motor (Strength): Mild weakness of the right lower extremity was observed.  Gait And Stance: ? A right sided antalgic gait was observed.  ?  Skin:  ? Normal. ? No ulcer was seen on the feet.  ?  ?  Assessment/Plan  1.?Osteoarthritis of left hip  ? Robotic assisted left total hip arthroplasty  ?  Risk,benefits, alternatives, and complications of operative and nonoperative treatments were discussed with patient and family. They understand, agree, and want to proceed with operation/procedure.  ?  Ordered:  Office/Outpatient Visit Level 3 New [id number] ,  Osteoarthritis of left hip  Avascular necrosis of femur head, left, [facility], 07/02/18 15:17:00 CDT  ?  2.?Avascular necrosis of femur head, left  Ordered:  Office/Outpatient Visit Level 3 New [id number], Osteoarthritis of left hip  Avascular necrosis of femur head, left, [facility], 07/02/18 15:17:00 CDT  ?  Left hip pain  Ordered:  XR Hip Left 2 Views, Routine, 07/02/18 14:46:00 CDT, Pain, None, Ambulatory, Rad Type, Left hip pain, 07/02/18 14:46:00 CDT  ?  Orders:  Clinic Follow-up PRN, 07/02/18 15:17:00 CDT, Future Order, [facility]   Problem List/Past Medical History  Ongoing  Avascular necrosis of femur head, left  Osteoarthritis of left hip  Historical  HTN (hypertension)  Procedure/Surgical History  lumbar surgery (11/13/2015), Right hand finger cyst (09/06/2015), left knee scope (2009), breast reduction (2004), Achilles tendon repair (07/1996).  Medications  Ambien 10 mg oral tablet, 10 mg= 1 tab(s), Oral, Once a day (at bedtime), PRN, 5 refills  aspirin 81 mg oral tablet, 81 mg= 1 tab(s), Oral, Daily  ATORVASTATIN 40MG TABLETS, Oral, Daily  Dilaudid, 1 mg= 0.5 mL, IM, Once  Flexeril 10 mg oral tablet, 10 mg= 1 tab(s), Oral, TID  LEVOTHYROXINE SODIUM 125 MCG TABS, Oral, Daily  MELOXICAM 15MG TABLETS, 15 mg= 1 tab(s), Oral, Daily  METOPROLOL ER SUCCINATE 100MG TABS, 300 mg= 3 tab(s), Oral  Multi-Day Plus Minerals oral tablet, 1 tab(s), Oral, Daily  Norco 325 mg-7.5 mg oral tablet, 1 tab(s), Oral, q4hr  VALSARTAN 160 MG TABS, 160 mg= 1 tab(s), Oral, Daily  Allergies  penicillins  Social History  Alcohol - Medium Risk, 07/22/2012  Current, Beer, Wine, Daily, 07/02/2018  Employment/School  Retired, disabled, 07/02/2018  Tobacco - Denies Tobacco Use, 07/22/2012  Never smoker Use:., 07/02/2018  Health Maintenance  Health Maintenance  ???Pending?(in the next year)  ??? ??OverDue  ??? ? ? ?Blood Pressure Screening due??03/10/15??and every 1??year(s)  ??? ? ? ?Colorectal  Screening due??11/11/15??and every 1??year(s)  ??? ? ? ?Diabetes Screening due??12/02/17??and every 3??year(s)  ??? ??Due?  ??? ? ? ?Alcohol Misuse Screening due??07/02/18??and every 1??year(s)  ??? ? ? ?Breast Cancer Screening due??07/02/18??Variable frequency  ??? ? ? ?Cervical Cancer Screening due??07/02/18??and every 5??year(s)  ??? ? ? ?Lipid Screening due??07/02/18??Variable frequency  ??? ? ? ?Tetanus Vaccine due??07/02/18??and every 10??year(s)  ??? ??Due In Future?  ??? ? ? ?Influenza Vaccine not due until??10/02/18??and every 1??year(s)  ???Satisfied?(in the past 1 year)  ??? ??Satisfied?  ??? ? ? ?Aspirin Therapy for CVD Prevention on??07/02/18.  ??? ? ? ?Body Mass Index Check on??07/02/18.??Satisfied by Leidy Winkler LPN  ??? ? ? ?Depression Screening on??07/02/18.??Satisfied by Leidy Winkler LPN  ??? ? ? ?Obesity Screening on??07/02/18.??Satisfied by Leidy Winkler LPN  ??? ? ? ?Tobacco Use Screening on??07/02/18.??Satisfied by Leidy Winkler LPN  ?  ?

## 2022-10-03 ENCOUNTER — DOCUMENTATION ONLY (OUTPATIENT)
Dept: ADMINISTRATIVE | Facility: HOSPITAL | Age: 62
End: 2022-10-03

## 2022-11-21 PROBLEM — E55.9 VITAMIN D DEFICIENCY: Status: ACTIVE | Noted: 2022-11-21

## 2022-11-21 PROBLEM — I10 PRIMARY HYPERTENSION: Status: ACTIVE | Noted: 2022-11-21

## 2022-11-21 PROBLEM — F51.01 PRIMARY INSOMNIA: Status: ACTIVE | Noted: 2022-11-21

## 2022-11-21 PROBLEM — E03.9 HYPOTHYROIDISM: Status: ACTIVE | Noted: 2022-11-21

## 2022-11-21 PROBLEM — E78.00 HYPERCHOLESTEROLEMIA: Status: ACTIVE | Noted: 2022-11-21

## 2022-11-21 PROBLEM — K21.9 GASTROESOPHAGEAL REFLUX DISEASE WITHOUT ESOPHAGITIS: Status: ACTIVE | Noted: 2022-11-21

## 2022-11-21 PROBLEM — M19.072: Status: ACTIVE | Noted: 2022-11-21

## 2025-02-20 PROBLEM — G47.33 OSA (OBSTRUCTIVE SLEEP APNEA): Status: ACTIVE | Noted: 2025-02-20

## 2025-07-14 ENCOUNTER — OFFICE VISIT (OUTPATIENT)
Dept: ORTHOPEDICS | Facility: CLINIC | Age: 65
End: 2025-07-14
Payer: MEDICARE

## 2025-07-14 VITALS
SYSTOLIC BLOOD PRESSURE: 141 MMHG | BODY MASS INDEX: 34.5 KG/M2 | HEIGHT: 62 IN | HEART RATE: 87 BPM | WEIGHT: 187.5 LBS | DIASTOLIC BLOOD PRESSURE: 79 MMHG

## 2025-07-14 DIAGNOSIS — M87.051 AVASCULAR NECROSIS OF RIGHT FEMORAL HEAD: ICD-10-CM

## 2025-07-14 DIAGNOSIS — M16.11 PRIMARY LOCALIZED OSTEOARTHRITIS OF RIGHT HIP: Primary | ICD-10-CM

## 2025-07-14 PROCEDURE — 1159F MED LIST DOCD IN RCRD: CPT | Mod: CPTII,,, | Performed by: PHYSICIAN ASSISTANT

## 2025-07-14 PROCEDURE — 4010F ACE/ARB THERAPY RXD/TAKEN: CPT | Mod: CPTII,,, | Performed by: PHYSICIAN ASSISTANT

## 2025-07-14 PROCEDURE — 1101F PT FALLS ASSESS-DOCD LE1/YR: CPT | Mod: CPTII,,, | Performed by: PHYSICIAN ASSISTANT

## 2025-07-14 PROCEDURE — 3008F BODY MASS INDEX DOCD: CPT | Mod: CPTII,,, | Performed by: PHYSICIAN ASSISTANT

## 2025-07-14 PROCEDURE — 3077F SYST BP >= 140 MM HG: CPT | Mod: CPTII,,, | Performed by: PHYSICIAN ASSISTANT

## 2025-07-14 PROCEDURE — 3078F DIAST BP <80 MM HG: CPT | Mod: CPTII,,, | Performed by: PHYSICIAN ASSISTANT

## 2025-07-14 PROCEDURE — 99202 OFFICE O/P NEW SF 15 MIN: CPT | Mod: ,,, | Performed by: PHYSICIAN ASSISTANT

## 2025-07-14 PROCEDURE — 1125F AMNT PAIN NOTED PAIN PRSNT: CPT | Mod: CPTII,,, | Performed by: PHYSICIAN ASSISTANT

## 2025-07-14 PROCEDURE — 1160F RVW MEDS BY RX/DR IN RCRD: CPT | Mod: CPTII,,, | Performed by: PHYSICIAN ASSISTANT

## 2025-07-14 PROCEDURE — 3288F FALL RISK ASSESSMENT DOCD: CPT | Mod: CPTII,,, | Performed by: PHYSICIAN ASSISTANT
